# Patient Record
Sex: MALE | Race: BLACK OR AFRICAN AMERICAN | Employment: FULL TIME | URBAN - METROPOLITAN AREA
[De-identification: names, ages, dates, MRNs, and addresses within clinical notes are randomized per-mention and may not be internally consistent; named-entity substitution may affect disease eponyms.]

---

## 2022-10-16 ENCOUNTER — APPOINTMENT (OUTPATIENT)
Dept: GENERAL RADIOLOGY | Age: 37
End: 2022-10-16
Attending: NURSE PRACTITIONER
Payer: COMMERCIAL

## 2022-10-16 ENCOUNTER — HOSPITAL ENCOUNTER (OUTPATIENT)
Age: 37
Discharge: HOME | End: 2022-10-16
Payer: COMMERCIAL

## 2022-10-16 VITALS
WEIGHT: 227.5 LBS | TEMPERATURE: 97 F | HEIGHT: 74 IN | RESPIRATION RATE: 20 BRPM | BODY MASS INDEX: 29.2 KG/M2 | HEART RATE: 62 BPM | DIASTOLIC BLOOD PRESSURE: 90 MMHG | SYSTOLIC BLOOD PRESSURE: 142 MMHG | OXYGEN SATURATION: 100 %

## 2022-10-16 DIAGNOSIS — R06.02 SOB (SHORTNESS OF BREATH): Primary | ICD-10-CM

## 2022-10-16 PROCEDURE — 99203 OFFICE O/P NEW LOW 30 MIN: CPT

## 2022-10-16 PROCEDURE — 93005 ELECTROCARDIOGRAM TRACING: CPT

## 2022-10-16 PROCEDURE — 93010 ELECTROCARDIOGRAM REPORT: CPT

## 2022-10-16 PROCEDURE — 71046 X-RAY EXAM CHEST 2 VIEWS: CPT | Performed by: NURSE PRACTITIONER

## 2022-10-16 PROCEDURE — 99204 OFFICE O/P NEW MOD 45 MIN: CPT

## 2022-10-16 NOTE — ED INITIAL ASSESSMENT (HPI)
Pt c/o meredith at times stating that he had covid 4 weeks ago but did not feel meredith at that time, pt denies cp or s/s left from covid

## 2022-10-17 LAB
ATRIAL RATE: 60 BPM
P AXIS: 38 DEGREES
P-R INTERVAL: 198 MS
Q-T INTERVAL: 392 MS
QRS DURATION: 114 MS
QTC CALCULATION (BEZET): 392 MS
R AXIS: 64 DEGREES
T AXIS: 21 DEGREES
VENTRICULAR RATE: 60 BPM

## 2023-03-13 ENCOUNTER — HOSPITAL ENCOUNTER (OUTPATIENT)
Age: 38
Discharge: HOME | End: 2023-03-13
Payer: COMMERCIAL

## 2023-03-13 VITALS
DIASTOLIC BLOOD PRESSURE: 95 MMHG | TEMPERATURE: 98 F | HEART RATE: 58 BPM | BODY MASS INDEX: 28.88 KG/M2 | WEIGHT: 225 LBS | RESPIRATION RATE: 16 BRPM | HEIGHT: 74 IN | OXYGEN SATURATION: 99 % | SYSTOLIC BLOOD PRESSURE: 144 MMHG

## 2023-03-13 DIAGNOSIS — S39.012A STRAIN OF LUMBAR REGION, INITIAL ENCOUNTER: Primary | ICD-10-CM

## 2023-03-13 PROCEDURE — 99213 OFFICE O/P EST LOW 20 MIN: CPT

## 2023-03-13 RX ORDER — IBUPROFEN 400 MG/1
400 TABLET ORAL EVERY 6 HOURS PRN
COMMUNITY

## 2023-03-13 RX ORDER — CYCLOBENZAPRINE HCL 10 MG
10 TABLET ORAL 3 TIMES DAILY PRN
Qty: 20 TABLET | Refills: 0 | Status: SHIPPED | OUTPATIENT
Start: 2023-03-13 | End: 2023-03-20

## 2023-03-13 RX ORDER — PREDNISONE 20 MG/1
40 TABLET ORAL DAILY
Qty: 10 TABLET | Refills: 0 | Status: SHIPPED | OUTPATIENT
Start: 2023-03-13 | End: 2023-03-18

## 2023-03-13 NOTE — ED INITIAL ASSESSMENT (HPI)
Pt with L lower/middle back pain since Friday; tends to worsen when he sits for a long time; no injury    No dysuria/hematuria

## 2023-03-13 NOTE — DISCHARGE INSTRUCTIONS
Take medications as directed. If you take the muscle relaxer do not drive for 6 to 8 hours. Follow-up with spine. If you have any leg numbness, weakness, or incontinence of urine or stool go to the emergency room.